# Patient Record
Sex: FEMALE | Race: WHITE | NOT HISPANIC OR LATINO | Employment: OTHER | URBAN - METROPOLITAN AREA
[De-identification: names, ages, dates, MRNs, and addresses within clinical notes are randomized per-mention and may not be internally consistent; named-entity substitution may affect disease eponyms.]

---

## 2024-01-24 ENCOUNTER — DOCUMENTATION ONLY (OUTPATIENT)
Dept: GERIATRICS | Facility: CLINIC | Age: 89
End: 2024-01-24
Payer: COMMERCIAL

## 2024-01-25 PROBLEM — U07.1 PNEUMONIA DUE TO COVID-19 VIRUS: Status: ACTIVE | Noted: 2024-01-24

## 2024-01-25 PROBLEM — E86.0 DEHYDRATION: Status: ACTIVE | Noted: 2024-01-21

## 2024-01-25 PROBLEM — E87.6 ACUTE HYPOKALEMIA: Status: ACTIVE | Noted: 2024-01-21

## 2024-01-25 PROBLEM — M62.81 GENERALIZED MUSCLE WEAKNESS: Status: ACTIVE | Noted: 2024-01-24

## 2024-01-25 PROBLEM — E87.1 HYPONATREMIA: Status: ACTIVE | Noted: 2024-01-21

## 2024-01-25 PROBLEM — N39.0 E. COLI UTI (URINARY TRACT INFECTION): Status: ACTIVE | Noted: 2024-01-21

## 2024-01-25 PROBLEM — B96.20 E. COLI UTI (URINARY TRACT INFECTION): Status: ACTIVE | Noted: 2024-01-21

## 2024-01-25 PROBLEM — J12.82 PNEUMONIA DUE TO COVID-19 VIRUS: Status: ACTIVE | Noted: 2024-01-24

## 2024-01-25 PROBLEM — E83.42 HYPOMAGNESEMIA: Status: ACTIVE | Noted: 2024-01-24

## 2024-01-25 NOTE — PROGRESS NOTES
"[facility] GERIATRICS  Primary Care Provider & Clinic: Libby Linda, [address] / [facility] [address]  Chief Complaint   Patient presents with    Hospital F/U     [facility] 1/21/2024 - 1/24/2024     [place] Medical Record Number: [id number]  Place of Service Where Encounter Took Place: [facility]  AT [facility] ([facility]) [[id number]]    Edie Rodney is a 88 year old (1935), admitted to the above facility from  [facility] . Hospital stay 1/21/24 through 1/24/24.    HPI:    Brief HPI Summary: Per her HPI, \"Edie Rodney is a 88 y.o. female with PMH of hypertension was recently diagnosed with COVID-19 infection on 01/11/2024. She was prescribed Paxlovid however she discontinued this as she did not like the way it made her feel.    She presents with generalized weakness which has been progressively worse over the course of the week. She endorses cough for over 3 weeks. No chest pain, shortness of breath, vomiting, abdominal pain, or diarrhea. Overall appetite has been poor and oral intake limited.    Patient lives with her daughter who reports that it has been increasingly difficult caring for her at home.\"    Please see the admission history and physical for full details.    Hospital Course, by problem:  Briefly, 89yo female with HTN, HLD, previously dx with Covid 19 on 1/11, presenting with complaint of increased weakness, poor appetite, urinary frequency, dry cough. Found to have hyponatremia, hypokalemia, hypomagnesemia, UTI, infiltrates on CXR.    Covid 19 viral pneumonia  Dx 1/11 and took most of the course of Paxlovid before stopping as it made her feel unwell.  Transient hypoxia, improved.  - procalcitonin was low : may be due to UTI.  - Robitussin and tylenol prn.  Improved, off O2    Ecoli UTI  Pt with urinary frequency, no dysuria. UA abnormal. Also mild leukocytosis.  - improving with IV rocephin, leukocytosis resolved.  UC reporting Ecoli: pansensitive  Switched to oral abx : " Cephalexin to complete course: 7 day total .    Hyponatremia:  Improved but not back to normal limits.  Could be secondary to HCTZ  - recommend follow up with PCP : HCTZ was not resumed at discharge    Hypokalemia:  Replaced  - resumed PTA potassium supplement but needs f/u with PCP to determined need to continue supplement when off diuretic : required replacement daily at the hospital when off diuretics.    Hypomagnesemia  Replaced Mg per protocol.    HTN  Stable. Continue cozaar.  Holding HCTZ : it was not resumed after discharge    HLD  On statin.    Weakness  PT and OT followed. Plan is for more therapy at TCU.    Nocturnal desaturation  Pt was administered O2 at times while sleeping.  No H/O sleep apnea but she has snoring per her daughter.  - discussed outpt sleep study which she is considering.    Patient seen today for follow up, pleasant, mild cognitive limitations, only wants to take a shower because she feels dirty, fairly independent stopped driving this year, ambulatory, lungs clear, denies dysuria, overall appears healthy.    Advance Care Planning Goals of Care Discussion 1/26/2024  Goals of care discussed with Edie Rodney on 1/26. Present at discussion: patient. Questions discussed and patient response:  What is your understanding now of where you are with your illness?: good. How much information about what is likely to be ahead with your illness would you like to have?: all. As the clinician I communicated the following to the patient regarding their prognosis: good. If your health situation worsens, what are your most important goals?: get home. What are your biggest fears and worries about the future with your health?: needing oxygen. Unacceptable function : NA. What abilities are so critical to your life that you cannot imagine living without them?: independence. Pt does NOT want to: die. If you become sicker, how much are you willing to go through for the possibility of gaining more time?:  "maybe. Would this change if these were permanent states, if they did not get better?: maybe. How much does your agent and/or family know about your priorities and wishes?: everything. Added by YUVAL Damico CNP        CODE STATUS/ADVANCE DIRECTIVES DISCUSSION: No Order - DNR / DNI  Patient's living condition: lives with family, (adult) daughter   ALLERGIES:   Allergies   Allergen Reactions    Codeine Nausea and Nausea and Vomiting      PAST MEDICAL HISTORY: No past medical history on file.   PAST SURGICAL HISTORY:  has no past surgical history on file.  FAMILY HISTORY: family history is not on file.  SOCIAL HISTORY:      Post Discharge Medication Reconciliation Status:  MED REC REQUIRED  Post Medication Reconciliation Status: discharge medications reconciled, continue medications without change    Current Outpatient Medications   Medication Sig    calcium carbonate-vitamin D (CALTRATE) 600-10 MG-MCG per tablet Take 1 tablet by mouth daily    cephALEXin (KEFLEX) 500 MG capsule Take 500 mg by mouth 2 times daily    cetirizine (ZYRTEC) 5 MG tablet Take 5 mg by mouth daily    ibuprofen (ADVIL/MOTRIN) 200 MG tablet Take 200 mg by mouth every 8 hours as needed for pain    losartan (COZAAR) 100 MG tablet Take 100 mg by mouth daily    multivitamin (CENTRUM SILVER) tablet Take 1 tablet by mouth daily    ondansetron (ZOFRAN ODT) 4 MG ODT tab Take 4 mg by mouth every 8 hours as needed for nausea or vomiting    potassium chloride ER (K-TAB/KLOR-CON) 10 MEQ CR tablet Take 10 mEq by mouth daily    simvastatin (ZOCOR) 20 MG tablet Take 20 mg by mouth at bedtime     No current facility-administered medications for this visit.     ROS:  4 point ROS including Respiratory, CV, GI and , other than that noted in the HPI,  is negative    Vitals:  /84   Pulse 81   Temp 97.3  F (36.3  C)   Resp 17   Ht 1.575 m (5' 2\")   Wt 69.6 kg (153 lb 8 oz)   SpO2 96%   BMI 28.08 kg/m    Exam:  GENERAL APPEARANCE:  in no " distress, appears healthy  ENT:  Mouth and posterior oropharynx normal, moist mucous membranes  RESP:  lungs clear to auscultation , no respiratory distress  CV:  regular rate and rhythm, no murmur, rub, or gallop, no edema  ABDOMEN:  bowel sounds normal  M/S:   Gait and station abnormal unsteady gait  SKIN:  Inspection of skin and subcutaneous tissue baseline  NEURO:   Examination of sensation by touch normal  PSYCH:  affect and mood normal    Lab/Diagnostic Data:  Recent labs in [facility] reviewed by me today.     ASSESSMENT/PLAN:  (U07.1,  J12.82) Pneumonia due to COVID-19 virus  (primary encounter diagnosis)  (M62.81) Generalized muscle weakness  Comment: covid+ 1/11, weak, deconditioned  Plan:   -PT/OT eval and treat   -give shower today  -CBC, BMP, Mg on 1/29  -CXR on 1/29      (N39.0,  B96.20) E. coli UTI (urinary tract infection)  Comment: denies dysuria  Plan: continue cephalexin through 1/28  -repeat UA/UC if indicated    (E87.1) Hyponatremia  Comment: Na was 125  Plan: hydrochlorothiazide dc'd  -BMP on 1/29    (E87.6) Hypokalemia  Comment: K was 2.5  Plan: continue K 10mEq  -BMP on 1/29    (E83.42) Hypomagnesemia  Comment: Mg was 1.5, not replaced  Plan: recheck Mg on 1/29    (I10) Essential hypertension  Comment: SBP's in the 130 range, no edema, hydrochlorothiazide Dc'd in hospital  Plan: continue losartan 100mg  -staff to check VS daily      (Z71.89) ACP (advance care planning)  Comment: code status DNR  Plan: [place] ADVANCE CARE PLANNING FIRST 30 MINS          I spent 16 minutes F2F with patient in addition to todays visit completing a POLST form.            Electronically signed by: YUVAL Damico CNP

## 2024-01-26 ENCOUNTER — TRANSITIONAL CARE UNIT VISIT (OUTPATIENT)
Dept: GERIATRICS | Facility: CLINIC | Age: 89
End: 2024-01-26
Payer: COMMERCIAL

## 2024-01-26 ENCOUNTER — TRANSFERRED RECORDS (OUTPATIENT)
Dept: HEALTH INFORMATION MANAGEMENT | Facility: CLINIC | Age: 89
End: 2024-01-26

## 2024-01-26 VITALS
RESPIRATION RATE: 17 BRPM | SYSTOLIC BLOOD PRESSURE: 131 MMHG | HEART RATE: 81 BPM | BODY MASS INDEX: 28.25 KG/M2 | DIASTOLIC BLOOD PRESSURE: 84 MMHG | OXYGEN SATURATION: 96 % | TEMPERATURE: 97.3 F | HEIGHT: 62 IN | WEIGHT: 153.5 LBS

## 2024-01-26 DIAGNOSIS — E83.42 HYPOMAGNESEMIA: ICD-10-CM

## 2024-01-26 DIAGNOSIS — M62.81 GENERALIZED MUSCLE WEAKNESS: ICD-10-CM

## 2024-01-26 DIAGNOSIS — I10 ESSENTIAL HYPERTENSION: Chronic | ICD-10-CM

## 2024-01-26 DIAGNOSIS — U07.1 PNEUMONIA DUE TO COVID-19 VIRUS: Primary | ICD-10-CM

## 2024-01-26 DIAGNOSIS — B96.20 E. COLI UTI (URINARY TRACT INFECTION): ICD-10-CM

## 2024-01-26 DIAGNOSIS — E87.6 HYPOKALEMIA: ICD-10-CM

## 2024-01-26 DIAGNOSIS — J12.82 PNEUMONIA DUE TO COVID-19 VIRUS: Primary | ICD-10-CM

## 2024-01-26 DIAGNOSIS — N39.0 E. COLI UTI (URINARY TRACT INFECTION): ICD-10-CM

## 2024-01-26 DIAGNOSIS — E87.1 HYPONATREMIA: ICD-10-CM

## 2024-01-26 DIAGNOSIS — Z71.89 ACP (ADVANCE CARE PLANNING): ICD-10-CM

## 2024-01-26 PROCEDURE — 99497 ADVNCD CARE PLAN 30 MIN: CPT | Performed by: NURSE PRACTITIONER

## 2024-01-26 PROCEDURE — 99309 SBSQ NF CARE MODERATE MDM 30: CPT | Performed by: NURSE PRACTITIONER

## 2024-01-26 RX ORDER — SIMVASTATIN 20 MG
20 TABLET ORAL AT BEDTIME
COMMUNITY
Start: 2024-01-26

## 2024-01-26 RX ORDER — POTASSIUM CHLORIDE 750 MG/1
10 TABLET, EXTENDED RELEASE ORAL DAILY
COMMUNITY
Start: 2024-01-26

## 2024-01-26 RX ORDER — LOSARTAN POTASSIUM 100 MG/1
100 TABLET ORAL DAILY
COMMUNITY
Start: 2024-01-26

## 2024-01-26 RX ORDER — CEPHALEXIN 500 MG/1
500 CAPSULE ORAL 2 TIMES DAILY
COMMUNITY
Start: 2024-01-24 | End: 2024-01-28

## 2024-01-26 RX ORDER — IBUPROFEN 200 MG
200 TABLET ORAL EVERY 8 HOURS PRN
COMMUNITY
Start: 2024-01-26

## 2024-01-26 RX ORDER — CALCIUM CARBONATE/VITAMIN D3 600 MG-10
1 TABLET ORAL DAILY
COMMUNITY
Start: 2024-01-26

## 2024-01-26 RX ORDER — MULTIVIT WITH MINERALS/LUTEIN
1 TABLET ORAL DAILY
COMMUNITY
Start: 2024-01-26

## 2024-01-26 RX ORDER — CETIRIZINE HYDROCHLORIDE 5 MG/1
5 TABLET ORAL DAILY
COMMUNITY
Start: 2024-01-26

## 2024-01-26 RX ORDER — ONDANSETRON 4 MG/1
4 TABLET, ORALLY DISINTEGRATING ORAL EVERY 8 HOURS PRN
COMMUNITY
Start: 2024-01-26

## 2024-01-26 NOTE — LETTER
"    1/26/2024        RE: Edie Rodney  [address]  [place] [address]        [facility] GERIATRICS  Primary Care Provider & Clinic: Libby Linda, [address] / [facility] [address]  Chief Complaint   Patient presents with     Hospital F/U     [facility] 1/21/2024 - 1/24/2024     [place] Medical Record Number: [id number]  Place of Service Where Encounter Took Place: [facility] AT [facility] ([facility]) [[id number]]    Edie Rodney is a 88 year old (1935), admitted to the above facility from  [facility] . Hospital stay 1/21/24 through 1/24/24.    HPI:    Brief HPI Summary: Per her HPI, \"Edie Rodney is a 88 y.o. female with PMH of hypertension was recently diagnosed with COVID-19 infection on 01/11/2024. She was prescribed Paxlovid however she discontinued this as she did not like the way it made her feel.    She presents with generalized weakness which has been progressively worse over the course of the week. She endorses cough for over 3 weeks. No chest pain, shortness of breath, vomiting, abdominal pain, or diarrhea. Overall appetite has been poor and oral intake limited.    Patient lives with her daughter who reports that it has been increasingly difficult caring for her at home.\"    Please see the admission history and physical for full details.    Hospital Course, by problem:  Briefly, 87yo female with HTN, HLD, previously dx with Covid 19 on 1/11, presenting with complaint of increased weakness, poor appetite, urinary frequency, dry cough. Found to have hyponatremia, hypokalemia, hypomagnesemia, UTI, infiltrates on CXR.    Covid 19 viral pneumonia  Dx 1/11 and took most of the course of Paxlovid before stopping as it made her feel unwell.  Transient hypoxia, improved.  - procalcitonin was low : may be due to UTI.  - Robitussin and tylenol prn.  Improved, off O2    Ecoli UTI  Pt with urinary frequency, no dysuria. UA abnormal. Also mild leukocytosis.  - improving " with IV rocephin, leukocytosis resolved.   reporting Ecoli: pansensitive  Switched to oral abx : Cephalexin to complete course: 7 day total .    Hyponatremia:  Improved but not back to normal limits.  Could be secondary to HCTZ  - recommend follow up with PCP : HCTZ was not resumed at discharge    Hypokalemia:  Replaced  - resumed PTA potassium supplement but needs f/u with PCP to determined need to continue supplement when off diuretic : required replacement daily at the hospital when off diuretics.    Hypomagnesemia  Replaced Mg per protocol.    HTN  Stable. Continue cozaar.  Holding HCTZ : it was not resumed after discharge    HLD  On statin.    Weakness  PT and OT followed. Plan is for more therapy at TCU.    Nocturnal desaturation  Pt was administered O2 at times while sleeping.  No H/O sleep apnea but she has snoring per her daughter.  - discussed outpt sleep study which she is considering.    Patient seen today for follow up, pleasant, mild cognitive limitations, only wants to take a shower because she feels dirty, fairly independent stopped driving this year, ambulatory, lungs clear, denies dysuria, overall appears healthy.    Advance Care Planning Goals of Care Discussion 1/26/2024  Goals of care discussed with Edie Rodney on 1/26. Present at discussion: patient. Questions discussed and patient response:  What is your understanding now of where you are with your illness?: good. How much information about what is likely to be ahead with your illness would you like to have?: all. As the clinician I communicated the following to the patient regarding their prognosis: good. If your health situation worsens, what are your most important goals?: get home. What are your biggest fears and worries about the future with your health?: needing oxygen. Unacceptable function : NA. What abilities are so critical to your life that you cannot imagine living without them?: independence. Pt does NOT want to: die. If you  become sicker, how much are you willing to go through for the possibility of gaining more time?: maybe. Would this change if these were permanent states, if they did not get better?: maybe. How much does your agent and/or family know about your priorities and wishes?: everything. Added by YUVAL Damico CNP        CODE STATUS/ADVANCE DIRECTIVES DISCUSSION: No Order - DNR / DNI  Patient's living condition: lives with family, (adult) daughter   ALLERGIES:   Allergies   Allergen Reactions     Codeine Nausea and Nausea and Vomiting      PAST MEDICAL HISTORY: No past medical history on file.   PAST SURGICAL HISTORY:  has no past surgical history on file.  FAMILY HISTORY: family history is not on file.  SOCIAL HISTORY:      Post Discharge Medication Reconciliation Status:  MED REC REQUIRED  Post Medication Reconciliation Status: discharge medications reconciled, continue medications without change    Current Outpatient Medications   Medication Sig     calcium carbonate-vitamin D (CALTRATE) 600-10 MG-MCG per tablet Take 1 tablet by mouth daily     cephALEXin (KEFLEX) 500 MG capsule Take 500 mg by mouth 2 times daily     cetirizine (ZYRTEC) 5 MG tablet Take 5 mg by mouth daily     ibuprofen (ADVIL/MOTRIN) 200 MG tablet Take 200 mg by mouth every 8 hours as needed for pain     losartan (COZAAR) 100 MG tablet Take 100 mg by mouth daily     multivitamin (CENTRUM SILVER) tablet Take 1 tablet by mouth daily     ondansetron (ZOFRAN ODT) 4 MG ODT tab Take 4 mg by mouth every 8 hours as needed for nausea or vomiting     potassium chloride ER (K-TAB/KLOR-CON) 10 MEQ CR tablet Take 10 mEq by mouth daily     simvastatin (ZOCOR) 20 MG tablet Take 20 mg by mouth at bedtime     No current facility-administered medications for this visit.     ROS:  4 point ROS including Respiratory, CV, GI and , other than that noted in the HPI,  is negative    Vitals:  /84   Pulse 81   Temp 97.3  F (36.3  C)   Resp 17   Ht  "1.575 m (5' 2\")   Wt 69.6 kg (153 lb 8 oz)   SpO2 96%   BMI 28.08 kg/m    Exam:  GENERAL APPEARANCE:  in no distress, appears healthy  ENT:  Mouth and posterior oropharynx normal, moist mucous membranes  RESP:  lungs clear to auscultation , no respiratory distress  CV:  regular rate and rhythm, no murmur, rub, or gallop, no edema  ABDOMEN:  bowel sounds normal  M/S:   Gait and station abnormal unsteady gait  SKIN:  Inspection of skin and subcutaneous tissue baseline  NEURO:   Examination of sensation by touch normal  PSYCH:  affect and mood normal    Lab/Diagnostic Data:  Recent labs in [facility] reviewed by me today.     ASSESSMENT/PLAN:  (U07.1,  J12.82) Pneumonia due to COVID-19 virus  (primary encounter diagnosis)  (M62.81) Generalized muscle weakness  Comment: covid+ 1/11, weak, deconditioned  Plan:   -PT/OT eval and treat   -give shower today  -CBC, BMP, Mg on 1/29  -CXR on 1/29      (N39.0,  B96.20) E. coli UTI (urinary tract infection)  Comment: denies dysuria  Plan: continue cephalexin through 1/28  -repeat UA/UC if indicated    (E87.1) Hyponatremia  Comment: Na was 125  Plan: hydrochlorothiazide dc'd  -BMP on 1/29    (E87.6) Hypokalemia  Comment: K was 2.5  Plan: continue K 10mEq  -BMP on 1/29    (E83.42) Hypomagnesemia  Comment: Mg was 1.5, not replaced  Plan: recheck Mg on 1/29    (I10) Essential hypertension  Comment: SBP's in the 130 range, no edema, hydrochlorothiazide Dc'd in hospital  Plan: continue losartan 100mg  -staff to check VS daily      (Z71.89) ACP (advance care planning)  Comment: code status DNR  Plan: [place] ADVANCE CARE PLANNING FIRST 30 MINS          I spent 16 minutes F2F with patient in addition to todays visit completing a POLST form.            Electronically signed by: YUVAL Damico CNP      Sincerely,        YUVAL Damico CNP      "

## 2024-01-29 ENCOUNTER — LAB REQUISITION (OUTPATIENT)
Dept: LAB | Facility: CLINIC | Age: 89
End: 2024-01-29
Payer: COMMERCIAL

## 2024-01-29 DIAGNOSIS — I10 ESSENTIAL (PRIMARY) HYPERTENSION: ICD-10-CM

## 2024-01-29 LAB
ANION GAP SERPL CALCULATED.3IONS-SCNC: 14 MMOL/L (ref 7–15)
BUN SERPL-MCNC: 7.7 MG/DL (ref 8–23)
CALCIUM SERPL-MCNC: 9 MG/DL (ref 8.8–10.2)
CHLORIDE SERPL-SCNC: 100 MMOL/L (ref 98–107)
CREAT SERPL-MCNC: 0.65 MG/DL (ref 0.51–0.95)
DEPRECATED HCO3 PLAS-SCNC: 20 MMOL/L (ref 22–29)
EGFRCR SERPLBLD CKD-EPI 2021: 84 ML/MIN/1.73M2
ERYTHROCYTE [DISTWIDTH] IN BLOOD BY AUTOMATED COUNT: 13 % (ref 10–15)
GLUCOSE SERPL-MCNC: 132 MG/DL (ref 70–99)
HCT VFR BLD AUTO: 37.9 % (ref 35–47)
HGB BLD-MCNC: 12.5 G/DL (ref 11.7–15.7)
MAGNESIUM SERPL-MCNC: 1.9 MG/DL (ref 1.7–2.3)
MCH RBC QN AUTO: 28.6 PG (ref 26.5–33)
MCHC RBC AUTO-ENTMCNC: 33 G/DL (ref 31.5–36.5)
MCV RBC AUTO: 87 FL (ref 78–100)
PLATELET # BLD AUTO: 353 10E3/UL (ref 150–450)
POTASSIUM SERPL-SCNC: 3.7 MMOL/L (ref 3.4–5.3)
RBC # BLD AUTO: 4.37 10E6/UL (ref 3.8–5.2)
SODIUM SERPL-SCNC: 134 MMOL/L (ref 135–145)
WBC # BLD AUTO: 5.9 10E3/UL (ref 4–11)

## 2024-01-29 PROCEDURE — 85027 COMPLETE CBC AUTOMATED: CPT | Mod: ORL | Performed by: NURSE PRACTITIONER

## 2024-01-29 PROCEDURE — 83735 ASSAY OF MAGNESIUM: CPT | Mod: ORL | Performed by: NURSE PRACTITIONER

## 2024-01-29 PROCEDURE — 80048 BASIC METABOLIC PNL TOTAL CA: CPT | Mod: ORL | Performed by: NURSE PRACTITIONER

## 2024-01-30 ENCOUNTER — TRANSITIONAL CARE UNIT VISIT (OUTPATIENT)
Dept: GERIATRICS | Facility: CLINIC | Age: 89
End: 2024-01-30
Payer: COMMERCIAL

## 2024-01-30 VITALS
RESPIRATION RATE: 17 BRPM | DIASTOLIC BLOOD PRESSURE: 84 MMHG | HEART RATE: 86 BPM | TEMPERATURE: 97.3 F | SYSTOLIC BLOOD PRESSURE: 142 MMHG | BODY MASS INDEX: 28.08 KG/M2 | OXYGEN SATURATION: 97 % | WEIGHT: 153.5 LBS

## 2024-01-30 DIAGNOSIS — U07.1 PNEUMONIA DUE TO COVID-19 VIRUS: Primary | ICD-10-CM

## 2024-01-30 DIAGNOSIS — G47.34 NOCTURNAL OXYGEN DESATURATION: ICD-10-CM

## 2024-01-30 DIAGNOSIS — K44.9 HIATAL HERNIA: ICD-10-CM

## 2024-01-30 DIAGNOSIS — J12.82 PNEUMONIA DUE TO COVID-19 VIRUS: Primary | ICD-10-CM

## 2024-01-30 PROCEDURE — 99309 SBSQ NF CARE MODERATE MDM 30: CPT | Performed by: NURSE PRACTITIONER

## 2024-01-30 NOTE — PROGRESS NOTES
[facility] GERIATRICS    Chief Complaint   Patient presents with    RECHECK     HPI:  Edie Rodney is a 88 year old  (1935), who is being seen today for an episodic care visit at: [facility] AT [facility] ([facility]) [[id number]]. Today's concern is:   1. Pneumonia due to COVID-19 virus    2. Nocturnal oxygen desaturation    3. Hiatal hernia      Patient seen for follow up, also met with [facility] today for update, pleasant, appears healthy, post PNE with covid, CXR on 1/26 with clear lungs and hiatal hernia, patient still requires O2 at bedtime for desats <90%, I&O adequate, no distress.    Allergies, and PMH/PSH reviewed in [facility] today.  REVIEW OF SYSTEMS:  4 point ROS including Respiratory, CV, GI and , other than that noted in the HPI,  is negative    Objective:   BP (!) 142/84   Pulse 86   Temp 97.3  F (36.3  C)   Resp 17   Wt 69.6 kg (153 lb 8 oz)   SpO2 97%   BMI 28.08 kg/m    GENERAL APPEARANCE:  in no distress, appears healthy  ENT:  Mouth and posterior oropharynx normal, moist mucous membranes  RESP:  lungs clear to auscultation , no respiratory distress  CV:  regular rate and rhythm, no murmur, rub, or gallop, no edema  ABDOMEN:  bowel sounds normal  M/S:   Gait and station abnormal unsteady gait  SKIN:  Inspection of skin and subcutaneous tissue baseline  NEURO:   Examination of sensation by touch normal  PSYCH:  affect and mood normal    Labs done in SNF are in [facility]. Please refer to them using [facility]/Care Everywhere.    Assessment/Plan:  (U07.1,  J12.82) Pneumonia due to COVID-19 virus  (primary encounter diagnosis)  (G47.34) Nocturnal oxygen desaturation  (K44.9) Hiatal hernia  Comment: labs 1/29 results all WNL, CXR 1/26 with clear lungs and hiatal hernia  Plan:   -PT/OT working with  -daily vitals  -completed cephalexin on 1/28  -continue losartan, K and statin  -continue oxygen overnight only (has at home also)  -probable discharge early next week per therapy    MED REC REQUIRED  Post  Medication Reconciliation Status: medication reconcilation previously completed during another office visit        Electronically signed by: YUVAL Damico CNP

## 2024-01-30 NOTE — LETTER
1/30/2024        RE: Edie Rodney  [address]  [facility] [id number]        [facility] GERIATRICS    Chief Complaint   Patient presents with     RECHECK     HPI:  Edie Rodney is a 88 year old  (1935), who is being seen today for an episodic care visit at: [facility] AT [facility] ([facility]) [[id number]]. Today's concern is:   1. Pneumonia due to COVID-19 virus    2. Nocturnal oxygen desaturation    3. Hiatal hernia      Patient seen for follow up, also met with [facility] today for update, pleasant, appears healthy, post PNE with covid, CXR on 1/26 with clear lungs and hiatal hernia, patient still requires O2 at bedtime for desats <90%, I&O adequate, no distress.    Allergies, and PMH/PSH reviewed in [facility] today.  REVIEW OF SYSTEMS:  4 point ROS including Respiratory, CV, GI and , other than that noted in the HPI,  is negative    Objective:   BP (!) 142/84   Pulse 86   Temp 97.3  F (36.3  C)   Resp 17   Wt 69.6 kg (153 lb 8 oz)   SpO2 97%   BMI 28.08 kg/m    GENERAL APPEARANCE:  in no distress, appears healthy  ENT:  Mouth and posterior oropharynx normal, moist mucous membranes  RESP:  lungs clear to auscultation , no respiratory distress  CV:  regular rate and rhythm, no murmur, rub, or gallop, no edema  ABDOMEN:  bowel sounds normal  M/S:   Gait and station abnormal unsteady gait  SKIN:  Inspection of skin and subcutaneous tissue baseline  NEURO:   Examination of sensation by touch normal  PSYCH:  affect and mood normal    Labs done in SNF are in [facility]. Please refer to them using [facility]/Care Everywhere.    Assessment/Plan:  (U07.1,  J12.82) Pneumonia due to COVID-19 virus  (primary encounter diagnosis)  (G47.34) Nocturnal oxygen desaturation  (K44.9) Hiatal hernia  Comment: labs 1/29 results all WNL, CXR 1/26 with clear lungs and hiatal hernia  Plan:   -PT/OT working with  -daily vitals  -completed cephalexin on 1/28  -continue losartan, K and statin  -continue oxygen overnight only  (has at home also)  -probable discharge early next week per therapy    MED REC REQUIRED  Post Medication Reconciliation Status: medication reconcilation previously completed during another office visit        Electronically signed by: YUVAL Damico CNP          Sincerely,        YUVAL Damico CNP

## 2024-02-01 VITALS
BODY MASS INDEX: 27.87 KG/M2 | OXYGEN SATURATION: 93 % | RESPIRATION RATE: 16 BRPM | SYSTOLIC BLOOD PRESSURE: 159 MMHG | TEMPERATURE: 98 F | HEART RATE: 94 BPM | DIASTOLIC BLOOD PRESSURE: 86 MMHG | WEIGHT: 152.4 LBS

## 2024-02-02 ENCOUNTER — DISCHARGE SUMMARY NURSING HOME (OUTPATIENT)
Dept: GERIATRICS | Facility: CLINIC | Age: 89
End: 2024-02-02
Payer: COMMERCIAL

## 2024-02-02 DIAGNOSIS — U07.1 PNEUMONIA DUE TO COVID-19 VIRUS: Primary | ICD-10-CM

## 2024-02-02 DIAGNOSIS — J12.82 PNEUMONIA DUE TO COVID-19 VIRUS: Primary | ICD-10-CM

## 2024-02-02 DIAGNOSIS — N39.0 E. COLI UTI (URINARY TRACT INFECTION): ICD-10-CM

## 2024-02-02 DIAGNOSIS — I10 ESSENTIAL HYPERTENSION: ICD-10-CM

## 2024-02-02 DIAGNOSIS — E87.1 HYPONATREMIA: ICD-10-CM

## 2024-02-02 DIAGNOSIS — M62.81 GENERALIZED MUSCLE WEAKNESS: ICD-10-CM

## 2024-02-02 DIAGNOSIS — B96.20 E. COLI UTI (URINARY TRACT INFECTION): ICD-10-CM

## 2024-02-02 DIAGNOSIS — E83.42 HYPOMAGNESEMIA: ICD-10-CM

## 2024-02-02 DIAGNOSIS — E87.6 HYPOKALEMIA: ICD-10-CM

## 2024-02-02 PROCEDURE — 99316 NF DSCHRG MGMT 30 MIN+: CPT | Performed by: NURSE PRACTITIONER

## 2024-02-02 NOTE — PROGRESS NOTES
[facility] GERIATRICS DISCHARGE SUMMARY  PATIENT'S NAME: Edie Rodney  YOB: 1935  MEDICAL RECORD NUMBER:  [id number]  Place of Service where encounter took place:  [facility]  AT [facility] ([facility]) [[id number]]    PRIMARY CARE PROVIDER AND CLINIC RESPONSIBLE AFTER TRANSFER:   Libby Linda, [address] / [facility] [address]    Non-G Provider     Transferring providers: YUVAL Mukherjee CNP; Cherri Wang MD  Recent Hospitalization/ED:  [facility]  stay 01/21/2024 to 01/24/2024.  Date of SNF Admission:  01/24/2024  Date of SNF (anticipated) Discharge:  02/05/2024  Discharged to: previous independent home  Cognitive Scores:  NA  Physical Function: Pivot transfers  DME: Walker    CODE STATUS/ADVANCE DIRECTIVES DISCUSSION:  DNR/DNI   ALLERGIES: Codeine    NURSING FACILITY COURSE   Medication Changes/Rationale:   See notes    Summary of nursing facility stay:      Pneumonia due to COVID-19 virus  Generalized muscle weakness  E. coli UTI (urinary tract infection)  Hyponatremia  Hypokalemia  Hypomagnesemia  Essential hypertension    (U07.1,  J12.82) Pneumonia due to COVID-19 virus  (primary encounter diagnosis)  (M62.81) Generalized muscle weakness  Comment: covid+ 1/11, weak, deconditioned  Plan:   -PT/OT eval and treat   -give shower today  -CBC, BMP, Mg on 1/29  -CXR on 1/29        (N39.0,  B96.20) E. coli UTI (urinary tract infection)  Comment: denies dysuria  Plan: continue cephalexin through 1/28  -repeat UA/UC if indicated     (E87.1) Hyponatremia  Comment: Na was 125  Plan: hydrochlorothiazide dc'd  -BMP on 1/29     (E87.6) Hypokalemia  Comment: K was 2.5  Plan: continue K 10mEq  -BMP on 1/29     (E83.42) Hypomagnesemia  Comment: Mg was 1.5, not replaced  Plan: recheck Mg on 1/29     (I10) Essential hypertension  Comment: SBP's in the 130 range, no edema, hydrochlorothiazide Dc'd in hospital  Plan: continue losartan 100mg  -staff to check VS daily        Discharge Medications:  MED REC REQUIRED  Post Medication Reconciliation Status: medication reconcilation previously completed during another office visit       Current Outpatient Medications   Medication Sig Dispense Refill    calcium carbonate-vitamin D (CALTRATE) 600-10 MG-MCG per tablet Take 1 tablet by mouth daily      cetirizine (ZYRTEC) 5 MG tablet Take 5 mg by mouth daily      ibuprofen (ADVIL/MOTRIN) 200 MG tablet Take 200 mg by mouth every 8 hours as needed for pain      losartan (COZAAR) 100 MG tablet Take 100 mg by mouth daily      multivitamin (CENTRUM SILVER) tablet Take 1 tablet by mouth daily      ondansetron (ZOFRAN ODT) 4 MG ODT tab Take 4 mg by mouth every 8 hours as needed for nausea or vomiting      potassium chloride ER (K-TAB/KLOR-CON) 10 MEQ CR tablet Take 10 mEq by mouth daily      simvastatin (ZOCOR) 20 MG tablet Take 20 mg by mouth at bedtime            Controlled medications:   not applicable/none     Past Medical History: No past medical history on file.  Physical Exam:   Vitals: BP (!) 159/86   Pulse 94   Temp 98  F (36.7  C)   Resp 16   Wt 69.1 kg (152 lb 6.4 oz)   SpO2 93%   BMI 27.87 kg/m    BMI: Body mass index is 27.87 kg/m .  GENERAL APPEARANCE:  in no distress, appears healthy  ENT:  Mouth and posterior oropharynx normal, moist mucous membranes  RESP:  lungs clear to auscultation , no respiratory distress  CV:  regular rate and rhythm, no murmur, rub, or gallop, no edema  ABDOMEN:  bowel sounds normal  M/S:   Gait and station abnormal using walker  SKIN:  Inspection of skin and subcutaneous tissue baseline  NEURO:   Examination of sensation by touch normal  PSYCH:  affect and mood normal     SNF labs: Labs done in SNF are in [place] EPIC. Please refer to them using EPIC/Care Everywhere.    DISCHARGE PLAN:  Follow up labs: No labs orders/due  Medical Follow Up:      Follow up with primary care provider in 1 weeks  Current [place] scheduled appointments:  Appointments  in Next Year      Feb 02, 2024  9:00 AM  Discharge Summary with YUVAL Damico CNP  [facility] ([facility] ) [phone]         NA  Discharge Services: Home Care:  Occupational Therapy, Physical Therapy, Registered Nurse, and Home Health Aide  Discharge Instructions Verbalized to Patient at Discharge:   None    TOTAL DISCHARGE TIME:   Greater than 30 minutes  Electronically signed by:  YUVAL Damico CNP         Documentation of Face-to-Face and Certification for Home Health Services     Patient: Edie Rodney   YOB: 1935  MR Number: [id number]  Today's Date: 2/2/2024    I certify that patient: Edie Rodney is under my care and that I, or a nurse practitioner or physician's assistant working with me, had a face-to-face encounter that meets the physician face-to-face encounter requirements with this patient on: 2/2/2024.    This encounter with the patient was in whole, or in part, for the following medical condition, which is the primary reason for home health care:   1. Pneumonia due to COVID-19 virus    2. Generalized muscle weakness    3. E. coli UTI (urinary tract infection)    4. Hyponatremia    5. Hypokalemia    6. Hypomagnesemia    7. Essential hypertension          I certify that, based on my findings, the following services are medically necessary home health services: Nursing, Occupational Therapy, Physical Therapy, and HHA .    My clinical findings support the need for the above services because: Nurse is needed: For complex aftercare of surgical procedures because the patient needs instruction and cannot perform care on their own due to: med education. and To provide caregiver training to assist with: oxygen sats.., Occupational Therapy Services are needed to assess and treat cognitive ability and address ADL safety due to impairment in transfers., Physical Therapy Services are needed to assess and treat  the following functional impairments: gait instability., and HHA for bathing.    Further, I certify that my clinical findings support that this patient is homebound (i.e. absences from home require considerable and taxing effort and are for medical reasons or Jewish services or infrequently or of short duration when for other reasons) because: Requires assistance of another person or specialized equipment to access medical services because patient: Is unable to operate assistive equipment on their own...    Based on the above findings. I certify that this patient is confined to the home and needs intermittent skilled nursing care, physical therapy and/or speech therapy.  The patient is under my care, and I have initiated the establishment of the plan of care.  This patient will be followed by a physician who will periodically review the plan of care.  Physician/Provider to provide follow up care: Libby Linda    Responsible Medicare certified PEC Physician: Chinedu Jacobs NP  Electronic Physician Signature.  Date: 2/2/2024

## 2024-02-02 NOTE — LETTER
2/2/2024        RE: Edie Rodney  [address]  [place] [address]        [facility] GERIATRICS DISCHARGE SUMMARY  PATIENT'S NAME: Edie Rodney  YOB: 1935  MEDICAL RECORD NUMBER:  [id number]  Place of Service where encounter took place:  [facility]  AT [facility] [[id number]]    PRIMARY CARE PROVIDER AND CLINIC RESPONSIBLE AFTER TRANSFER:   Libby Linda, [address] / [facility] [address]    Non-G Provider     Transferring providers: YUVAL Mukherjee CNP; Cherri Wang MD  Recent Hospitalization/ED:  [facility]  stay 01/21/2024 to 01/24/2024.  Date of SNF Admission:  01/24/2024  Date of SNF (anticipated) Discharge:  02/05/2024  Discharged to: previous independent home  Cognitive Scores:  NA  Physical Function: Pivot transfers  DME: Walker    CODE STATUS/ADVANCE DIRECTIVES DISCUSSION:  DNR/DNI   ALLERGIES: Codeine    NURSING FACILITY COURSE   Medication Changes/Rationale:   See notes    Summary of nursing facility stay:      Pneumonia due to COVID-19 virus  Generalized muscle weakness  E. coli UTI (urinary tract infection)  Hyponatremia  Hypokalemia  Hypomagnesemia  Essential hypertension    (U07.1,  J12.82) Pneumonia due to COVID-19 virus  (primary encounter diagnosis)  (M62.81) Generalized muscle weakness  Comment: covid+ 1/11, weak, deconditioned  Plan:   -PT/OT eval and treat   -give shower today  -CBC, BMP, Mg on 1/29  -CXR on 1/29        (N39.0,  B96.20) E. coli UTI (urinary tract infection)  Comment: denies dysuria  Plan: continue cephalexin through 1/28  -repeat UA/UC if indicated     (E87.1) Hyponatremia  Comment: Na was 125  Plan: hydrochlorothiazide dc'd  -BMP on 1/29     (E87.6) Hypokalemia  Comment: K was 2.5  Plan: continue K 10mEq  -BMP on 1/29     (E83.42) Hypomagnesemia  Comment: Mg was 1.5, not replaced  Plan: recheck Mg on 1/29     (I10) Essential hypertension  Comment: SBP's in the 130 range, no edema,  hydrochlorothiazide Dc'd in hospital  Plan: continue losartan 100mg  -staff to check VS daily       Discharge Medications:  MED REC REQUIRED  Post Medication Reconciliation Status: medication reconcilation previously completed during another office visit       Current Outpatient Medications   Medication Sig Dispense Refill     calcium carbonate-vitamin D (CALTRATE) 600-10 MG-MCG per tablet Take 1 tablet by mouth daily       cetirizine (ZYRTEC) 5 MG tablet Take 5 mg by mouth daily       ibuprofen (ADVIL/MOTRIN) 200 MG tablet Take 200 mg by mouth every 8 hours as needed for pain       losartan (COZAAR) 100 MG tablet Take 100 mg by mouth daily       multivitamin (CENTRUM SILVER) tablet Take 1 tablet by mouth daily       ondansetron (ZOFRAN ODT) 4 MG ODT tab Take 4 mg by mouth every 8 hours as needed for nausea or vomiting       potassium chloride ER (K-TAB/KLOR-CON) 10 MEQ CR tablet Take 10 mEq by mouth daily       simvastatin (ZOCOR) 20 MG tablet Take 20 mg by mouth at bedtime            Controlled medications:   not applicable/none     Past Medical History: No past medical history on file.  Physical Exam:   Vitals: BP (!) 159/86   Pulse 94   Temp 98  F (36.7  C)   Resp 16   Wt 69.1 kg (152 lb 6.4 oz)   SpO2 93%   BMI 27.87 kg/m    BMI: Body mass index is 27.87 kg/m .  GENERAL APPEARANCE:  in no distress, appears healthy  ENT:  Mouth and posterior oropharynx normal, moist mucous membranes  RESP:  lungs clear to auscultation , no respiratory distress  CV:  regular rate and rhythm, no murmur, rub, or gallop, no edema  ABDOMEN:  bowel sounds normal  M/S:   Gait and station abnormal using walker  SKIN:  Inspection of skin and subcutaneous tissue baseline  NEURO:   Examination of sensation by touch normal  PSYCH:  affect and mood normal     SNF labs: Labs done in SNF are in [place] EPIC. Please refer to them using [employer]/Care Everywhere.    DISCHARGE PLAN:  Follow up labs: No labs orders/due  Medical Follow Up:       Follow up with primary care provider in 1 weeks  [facility] scheduled appointments:  Appointments in Next Year      Feb 02, 2024  9:00 AM  Discharge Summary with YUVAL Damico CNP  [facility] ([facility] ) [phone]         NA  Discharge Services: Home Care:  Occupational Therapy, Physical Therapy, Registered Nurse, and Home Health Aide  Discharge Instructions Verbalized to Patient at Discharge:   None    TOTAL DISCHARGE TIME:   Greater than 30 minutes  Electronically signed by:  YUVAL Damico CNP         Documentation of Face-to-Face and Certification for Home Health Services     Patient: Edie Rodney   YOB: 1935  MR Number: [id number]  Today's Date: 2/2/2024    I certify that patient: Edie Rodney is under my care and that I, or a nurse practitioner or physician's assistant working with me, had a face-to-face encounter that meets the physician face-to-face encounter requirements with this patient on: 2/2/2024.    This encounter with the patient was in whole, or in part, for the following medical condition, which is the primary reason for home health care:   1. Pneumonia due to COVID-19 virus    2. Generalized muscle weakness    3. E. coli UTI (urinary tract infection)    4. Hyponatremia    5. Hypokalemia    6. Hypomagnesemia    7. Essential hypertension          I certify that, based on my findings, the following services are medically necessary home health services: Nursing, Occupational Therapy, Physical Therapy, and HHA .    My clinical findings support the need for the above services because: Nurse is needed: For complex aftercare of surgical procedures because the patient needs instruction and cannot perform care on their own due to: med education. and To provide caregiver training to assist with: oxygen sats.., Occupational Therapy Services are needed to assess and treat cognitive ability and  address ADL safety due to impairment in transfers., Physical Therapy Services are needed to assess and treat the following functional impairments: gait instability., and HHA for bathing.    Further, I certify that my clinical findings support that this patient is homebound (i.e. absences from home require considerable and taxing effort and are for medical reasons or [employer] services or infrequently or of short duration when for other reasons) because: Requires assistance of another person or specialized equipment to access medical services because patient: Is unable to operate assistive equipment on their own...    Based on the above findings. I certify that this patient is confined to the home and needs intermittent skilled nursing care, physical therapy and/or speech therapy.  The patient is under my care, and I have initiated the establishment of the plan of care.  This patient will be followed by a physician who will periodically review the plan of care.  Physician/Provider to provide follow up care: Libby Linda    Responsible Medicare certified PECOS Physician: Chinedu Jacobs NP  Electronic Physician Signature.  Date: 2/2/2024                 Sincerely,        Chinedu Jacobs, YUVAL CNP